# Patient Record
Sex: FEMALE | Race: WHITE
[De-identification: names, ages, dates, MRNs, and addresses within clinical notes are randomized per-mention and may not be internally consistent; named-entity substitution may affect disease eponyms.]

---

## 2019-03-10 ENCOUNTER — HOSPITAL ENCOUNTER (OUTPATIENT)
Dept: HOSPITAL 82 - PAGE | Age: 34
Discharge: HOME | DRG: 951 | End: 2019-03-10
Attending: NURSE PRACTITIONER

## 2019-03-10 DIAGNOSIS — Z23: Primary | ICD-10-CM

## 2021-11-29 NOTE — NUR
PATIENT AMBULATED WITH STEADY GAIT TO RESTROOM TO PROVIDE URINE SAMPLE. SETTLED
BACK INTO BED. UNCOMFORTABLE BUT NOT IN DISTRESS. VSS.

## 2021-11-29 NOTE — NUR
IVF ADMINSTERED PER ORDERS AND ADMISSION COMPLETED AT THIS TIME. ASSESSMENT
ALSO COMPLETED. ABD TENDER IN ALL QUADRANTS W/HYPO BOWEL SOUNDS.

## 2021-11-29 NOTE — NUR
PATIENT MEDICATED FOR PAIN. BLOOD CULTURES DRAWN. IV FLUIDS AND ANTIBIOTICS
STARTED. PATIENT EXPRESSES PAIN RELIEF.

## 2021-11-29 NOTE — NUR
PATIENT REQUESTS PAIN MEDICATION. ASSISTED TO THE RESTROOM. REMINDED OF NPO
STATUS WHEN SHE REQUESTED A DRINK. REASSURANCE PROVIDED. NOTIFIED MD OF NEED
FOR PAIN MEDICATION.

## 2021-11-29 NOTE — NUR
PT ARRIVED TO MED SURG UNIT VIA WC ACCOMPANIED BY ED NURSE. PT APPEARS TO BE
STABLE AT THIS TIME. PT REPORTS PAIN IS CONTROLLED AND "MUCH BETTER." SHE SELF
AMBULATED TO RESTROOM AND BACK TO THE BED. OFFERS OF ASSISTANCE DENIED, STEADY
GAIT OBSERVED. PT ORIENTED TO ROOM, CALL SYSTEM, LIGHTS, BED AND TV,
VERBALIZED UNDERSTANDING. V/S ASSESSED.

## 2021-11-30 NOTE — NUR
PT MEDICATED FOR PAIN AND W/IV ANTIBIOTIC THERAPY AS ORDERS PROVIDE. PT
REPORTS PAIN 3/10 ON PAIN SCALE. DENIES ANY OTHER NEEDS AT THIS TIME.

## 2021-11-30 NOTE — NUR
PT REPORTS FEELING WELL AND WANTING TO GO HOME. DENIES ANY MORE NAUSEA OR PAIN
AT THIS TIME. TOLERATED DINNER WELL. PT TO BE D/C

## 2021-11-30 NOTE — NUR
Discharge instructions given. Patient verbalizes understanding of same.
Discharged in stable condition via Wheelchair to Home with
staff. All belongings sent with pt. Special instructions given for no heavy
lifting or strenuous activity. Business card for Dr. Delong also given to
follow up with in 7-10.

## 2021-11-30 NOTE — NUR
PT AWAKE AND ALERT UPON ENTERING ROOM. REPORTS PAIN AT LOW FROM PAIN
MEDICATION GIVEN FROM SYED RN. PT ALLOWED ASSESSMENT TO BE DONE AT THIS TIME.
LUNG SOUNDS ARE CLEAR UPPER/ LOWER LOBES EQUALLY BILATERALLY. BREATHING IS
EVEN AND UNLABORED. BOWEL SOUNDS X4, HEART SOUNDS ARE REGULAR. PT HAS 18G ON
LAC, WITH D5 1/2 NS PER EMAR. BED IS AT LOWEST POSTION, FALL PRECAUTIONS ARE
IN PLACE. CALL LIGHT AND PERSONAL ITEMS ARE WITHIN REACH.

## 2021-11-30 NOTE — NUR
PT ARRIVED VIA STRETCHER ACCOMPANIED BY ALLEGRA GARCIA RN. PT ABLE TO
TRANSFER FROM STRETCHER TO BED WITH SLOW STEADY GAIT, SLIGHTLY DROWSY BUT ABLE
TO FOLLOW COMMANDS.  PT C/O OF SOME NAUSEA DECLINES NEED FOR MEDICATION AT
THIS TIME. X3 LAPAROSCOPIC INCISIONS NOTED WITH DERMABOND, ALL LOOK CDI.
BILATERAL SCDS APPLIED. CONTACTS REMAIN AT BEDSIDE TABLE AS PATIENT IS NOT
ABLE TO SEE WITHOUT THEM. PTS BP ON THE SOFTER SIDE 97/45, PT ASYMPTOMATIC. PT
INSTRUCTED TO CALL BEFORE GETTING OOB. CALL LIGHT WITHIN REACH.